# Patient Record
Sex: FEMALE | Race: BLACK OR AFRICAN AMERICAN
[De-identification: names, ages, dates, MRNs, and addresses within clinical notes are randomized per-mention and may not be internally consistent; named-entity substitution may affect disease eponyms.]

---

## 2017-03-21 ENCOUNTER — HOSPITAL ENCOUNTER (EMERGENCY)
Dept: HOSPITAL 61 - ER | Age: 22
Discharge: HOME | End: 2017-03-21
Payer: COMMERCIAL

## 2017-03-21 VITALS — HEIGHT: 65.5 IN | BODY MASS INDEX: 36.21 KG/M2 | WEIGHT: 220 LBS

## 2017-03-21 VITALS — DIASTOLIC BLOOD PRESSURE: 72 MMHG | SYSTOLIC BLOOD PRESSURE: 124 MMHG

## 2017-03-21 DIAGNOSIS — J32.9: Primary | ICD-10-CM

## 2017-03-21 DIAGNOSIS — J45.909: ICD-10-CM

## 2017-03-21 DIAGNOSIS — E11.9: ICD-10-CM

## 2017-03-21 PROCEDURE — 99283 EMERGENCY DEPT VISIT LOW MDM: CPT

## 2017-03-21 PROCEDURE — 81025 URINE PREGNANCY TEST: CPT

## 2017-03-21 NOTE — PHYS DOC
Past Medical History


Past Medical History:  Asthma, Diabetes-Type II


Additional Past Medical Histor:  INSULIN RESISTANCE SYNDROME


Past Surgical History:  Cholecystectomy, Other


Additional Past Surgical Histo:  WISDOM TEETH EXTRACT


Alcohol Use:  None


Drug Use:  None





Adult General


Chief Complaint


Chief Complaint:  FACE PAIN





[facility]


HPI


Patient is a 21  year old female presents emergency department stating that she'

s had sinus congestion with nasal drainage and a cough that started about a 

month ago. She states that this time she just has a nasal pressure and 

congestion. She states she has sinus pressure over the frontal area. She states 

that she has been having chills but denies fevers that she has not taken her 

temperature. Patient states her last menstrual period was in February 

tenderness concern for pregnancy. She does state she took a pregnancy test 2 

weeks ago at home and it was negative. Denies vaginal discharge. Denies any 

abdominal pain or discomfort.





Review of Systems


Review of Systems





Constitutional: Denies fever. C/o chills


Eyes: Denies change in visual acuity, redness, or eye pain []


HENT:  nasal congestion, frontal sinus pressure denies sore throat []


Respiratory: Denies cough or shortness of breath []


Cardiovascular: No additional information not addressed in HPI []


GI: Denies abdominal pain, nausea, vomiting, bloody stools or diarrhea []


: Denies dysuria or hematuria []


Musculoskeletal: Denies back pain or joint pain []


Integument: Denies rash or skin lesions []


Neurologic: Denies headache, focal weakness or sensory changes []





Allergies


Allergies





 Allergies








Coded Allergies Type Severity Reaction Last Updated Verified


 


  No Known Drug Allergies    10/1/15 No











Physical Exam


Physical Exam





Constitutional: Well developed, well nourished, no acute distress, non-toxic 

appearance. []


HENT: Normocephalic, atraumatic, bilateral external ears normal, oropharynx 

moist, no oral exudates, nose normal. Bilateral tympanic membranes appear to be 

normal. Patient with frontal sinus pressure. Patient with nasal congestion. 

Throat appears to have postnasal drip no erythematous no exudate noted.


Eyes: PERRLA, EOMI, conjunctiva normal, no discharge. [] 


Neck: Normal range of motion, no tenderness, supple, no stridor. [] 


Cardiovascular:Heart rate regular rhythm, no murmur []


Lungs & Thorax:  Bilateral breath sounds clear to auscultation []


Skin: Warm, dry, no erythema, no rash. [] 


Back: No tenderness


Extremities: No tenderness, no cyanosis, no clubbing, ROM intact, no edema. [] 


Neurologic: Alert and oriented X 3, normal motor function, normal sensory 

function, no focal deficits noted. []


Psychologic: Affect normal, judgement normal, mood normal. []





Current Patient Data


Vital Signs





 Vital Signs








  Date Time  Temp Pulse Resp B/P Pulse Ox O2 Delivery O2 Flow Rate FiO2


 


3/21/17 15:47 97.4 86 16  100 Room Air  





 97.4       








Lab Values





 Laboratory Tests








Test


  3/21/17


15:44


 


POC Urine HCG, Qualitative


  Hcg negative


(Negative)











EKG


EKG


[]





Radiology/Procedures


Radiology/Procedures


[]





Course & Med Decision Making


Course & Med Decision Making


Pertinent Labs and Imaging studies reviewed. (See chart for details)


Pregnancy test is negative. Patient will be placed on Augmentin with 

recommendations for Sudafed and Mucinex as instructed by . 

Recommended plenty of fluids. Signs symptoms to return back to emergency 

department as been provided. Patient agrees with discharge instructions 

treatment regimens and follow-up recommendations.


[]





Dragon Disclaimer


Dragon Disclaimer


This electronic medical record was generated, in whole or in part, using a 

voice recognition dictation system.





Departure


Departure


Impression:  


 Primary Impression:  


 Sinusitis


Disposition:  01 HOME, SELF-CARE


Condition:  STABLE


Referrals:  


NO PCP (PCP)


Patient Instructions:  Sinusitis, Easy-to-Read





Additional Instructions:


Pregnancy test was negative


Activity as tolerated


Sudafed as directed by manufacture


Mucinex as directed by manufacture


Medication as prescribed


Drink plenty of fluids


Followup with primary care provider in 3-5 days


Return to emergency department as needed for signs and symptoms that become 

worse.


Scripts


Amoxicillin/Potassium Clav (Augmentin 875-125 Tablet)1 Each Tablet1 Tab PO BID #

20 TAB


   Prov:YELENA FLEMING         3/21/17








YELENA FLEMING Mar 21, 2017 16:29

## 2017-05-20 ENCOUNTER — HOSPITAL ENCOUNTER (EMERGENCY)
Dept: HOSPITAL 61 - ER | Age: 22
Discharge: HOME | End: 2017-05-20
Payer: COMMERCIAL

## 2017-05-20 VITALS — DIASTOLIC BLOOD PRESSURE: 69 MMHG | SYSTOLIC BLOOD PRESSURE: 127 MMHG

## 2017-05-20 VITALS — WEIGHT: 260 LBS | HEIGHT: 65 IN | BODY MASS INDEX: 43.32 KG/M2

## 2017-05-20 DIAGNOSIS — J45.909: ICD-10-CM

## 2017-05-20 DIAGNOSIS — H92.02: Primary | ICD-10-CM

## 2017-05-20 DIAGNOSIS — E11.9: ICD-10-CM

## 2017-05-20 PROCEDURE — 99281 EMR DPT VST MAYX REQ PHY/QHP: CPT

## 2017-05-20 NOTE — PHYS DOC
Past Medical History


Past Medical History:  Asthma, Diabetes-Type II


Additional Past Medical Histor:  INSULIN RESISTANCE SYNDROME


Past Surgical History:  Cholecystectomy, Other


Additional Past Surgical Histo:  WISDOM TEETH EXTRACT


Alcohol Use:  None


Drug Use:  None





Adult General


Chief Complaint


Chief Complaint:  EARACHE/EAR PAIN





[facility]


HPI





Patient is a 21  year old female presents emergency department stating that she 

went out with her friends for the weekend and has been drinking. She states she 

came back, and went to bed. She states when she got up this morning she had 

muffled hearing out of the left ear. She states she did take a be penetrated 

clean her ear out. She denies obtaining any type of object. She denies any 

drainage or discharge coming from the site. She denies any fever, chills or any 

nausea vomiting.





Review of Systems


Review of Systems





Constitutional: Denies fever or chills []


Eyes: Denies change in visual acuity, redness, or eye pain []


HENT: Denies nasal congestion or sore throat. C/o left ear discomfort


Respiratory: Denies cough or shortness of breath []


Cardiovascular: No additional information not addressed in HPI []


GI: Denies abdominal pain, nausea, vomiting, bloody stools or diarrhea []


: Denies dysuria or hematuria []


Musculoskeletal: Denies back pain or joint pain []


Integument: Denies rash or skin lesions []


Neurologic: Denies headache, focal weakness or sensory changes []


Endocrine: Denies polyuria or polydipsia []





Allergies


Allergies





Allergies








Coded Allergies Type Severity Reaction Last Updated Verified


 


  No Known Drug Allergies    10/1/15 No











Physical Exam


Physical Exam





Constitutional: Well developed, well nourished, no acute distress, non-toxic 

appearance. []


HENT: Normocephalic, atraumatic, bilateral external ears normal, oropharynx 

moist, no oral exudates, nose normal. Right tympanic membranes appear to be 

normal. Left tympanic membrane appears to have an effusion although the area is 

not red. It does appear that she has a ruptured tympanic membrane as there is 

dark colored secretions noted at the lower part of the tympanic membrane. 

However this may also be a cerumen impaction.


Eyes: PERRLA, EOMI, conjunctiva normal, no discharge. [] 


Neck: Normal range of motion, no tenderness, supple, no stridor. [] 


Cardiovascular:Heart rate regular rhythm, no murmur []


Lungs & Thorax:  Bilateral breath sounds clear to auscultation []


Skin: Warm, dry, no erythema, no rash. [] 


Back: No tenderness,


Extremities: No tenderness, no cyanosis, no clubbing, ROM intact, no edema. [] 


Neurologic: Alert and oriented X 3, normal motor function, normal sensory 

function, no focal deficits noted. []


Psychologic: Affect normal, judgement normal, mood normal. []





Current Patient Data


Vital Signs





 Vital Signs








  Date Time  Temp Pulse Resp B/P (MAP) Pulse Ox O2 Delivery O2 Flow Rate FiO2


 


5/20/17 11:37 98.2 91 18  98 Room Air  





 98.2       











EKG


EKG


[]





Radiology/Procedures


Radiology/Procedures


[]





Course & Med Decision Making


Course & Med Decision Making


Pertinent Labs and Imaging studies reviewed. (See chart for details)


She'll be discharged home in stable condition with recommendations for Tylenol 

or ibuprofen for pain and discomfort. She'll be discharged with signs and 

symptoms to return back to emergency department. Patient agrees with discharge 

instructions treatment regimens and follow-up recommendations.


[]





Dragon Disclaimer


Dragon Disclaimer


This electronic medical record was generated, in whole or in part, using a 

voice recognition dictation system.





Departure


Departure


Impression:  


 Primary Impression:  


 Otalgia of left ear


Disposition:  01 HOME, SELF-CARE


Condition:  STABLE


Referrals:  


NO PCP (PCP)


Patient Instructions:  Otalgia-Brief, Tympanic Membrane Perforation-SportsMed





Additional Instructions:  


Activity as tolerated.


Medications as prescribed.


Warm moist packs to the ear area.


Keep the area clean and dry. Whenever you're taking a shower make sure you 

place a cotton ball in the ear to prevent water from going into the ear.


Follow-up through primary care physician in next 3-5 days.


Return back to emergency prior signs symptoms of become worse.











YELENA FLEMING APRN May 20, 2017 11:56

## 2017-09-09 ENCOUNTER — HOSPITAL ENCOUNTER (EMERGENCY)
Dept: HOSPITAL 61 - ER | Age: 22
Discharge: HOME | End: 2017-09-09
Payer: COMMERCIAL

## 2017-09-09 VITALS — DIASTOLIC BLOOD PRESSURE: 63 MMHG | SYSTOLIC BLOOD PRESSURE: 143 MMHG

## 2017-09-09 VITALS — HEIGHT: 65 IN | BODY MASS INDEX: 43.32 KG/M2 | WEIGHT: 260 LBS

## 2017-09-09 DIAGNOSIS — N93.8: Primary | ICD-10-CM

## 2017-09-09 DIAGNOSIS — E88.81: ICD-10-CM

## 2017-09-09 DIAGNOSIS — J45.909: ICD-10-CM

## 2017-09-09 DIAGNOSIS — Z90.49: ICD-10-CM

## 2017-09-09 DIAGNOSIS — E11.9: ICD-10-CM

## 2017-09-09 PROCEDURE — 99282 EMERGENCY DEPT VISIT SF MDM: CPT

## 2017-09-09 PROCEDURE — 81025 URINE PREGNANCY TEST: CPT

## 2017-09-09 NOTE — PHYS DOC
Past Medical History


Past Medical History:  Asthma, Diabetes-Type II


Additional Past Medical Histor:  INSULIN RESISTANCE SYNDROME


Past Surgical History:  Cholecystectomy, Other


Additional Past Surgical Histo:  WISDOM TEETH EXTRACT


Alcohol Use:  None


Drug Use:  None





Adult General


Chief Complaint


Chief Complaint:  VAGINAL PROBLEM





HPI


HPI





Patient is a 21  year old female presents to the emergency department with a one

-week history of vaginal bleeding. Patient states her last normal menstrual 

period was , normal cycle normal timing. Patient states that one week 

ago she developed vaginal bleeding. She states she has no abdominal pain, no 

cramping. No vaginal discharge prior to the bleeding. She states she is not 

sexually active and has not been for 6 months. She has no complaints of headache

, lightheadedness, chest pain, abdominal pain, nausea, vomiting. Patient 

reports that she saturates 3 pads in a 24-hour window.





Review of Systems


Review of Systems





Constitutional: Denies fever or chills []


Eyes: Denies change in visual acuity, redness, or eye pain []


HENT: Denies nasal congestion or sore throat []


Respiratory: Denies cough or shortness of breath []


Cardiovascular: No additional information not addressed in HPI []


GI: Denies abdominal pain, nausea, vomiting, bloody stools or diarrhea []


: Denies dysuria or hematuria, vaginal bleeding[]


Musculoskeletal: Denies back pain or joint pain []


Integument: Denies rash or skin lesions []


Neurologic: Denies headache, focal weakness or sensory changes []


Endocrine: Denies polyuria or polydipsia []





Allergies


Allergies





Allergies








Coded Allergies Type Severity Reaction Last Updated Verified


 


  No Known Drug Allergies    10/1/15 No











Physical Exam


Physical Exam





Constitutional: Well developed, well nourished, no acute distress, non-toxic 

appearance. []


HENT: Normocephalic, atraumatic, bilateral external ears normal, oropharynx 

moist, no oral exudates, nose normal. []


Eyes: PERRLA, EOMI, conjunctiva normal, no discharge. [] 


Neck: Normal range of motion, no tenderness, supple, no stridor. [] 


Cardiovascular:Heart rate regular rhythm, no murmur []


Lungs & Thorax:  Bilateral breath sounds clear to auscultation []


Abdomen: Bowel sounds normal, soft, no tenderness, no masses, no pulsatile 

masses.


:  pt refused


Skin: Warm, dry, no erythema, no rash. [] 


Back: No tenderness, no CVA tenderness. []





EKG


EKG


[]





Radiology/Procedures


Radiology/Procedures


[]





Course & Med Decision Making


Course & Med Decision Making


Pertinent Labs and Imaging studies reviewed. (See chart for details)





Differential diagnosis: Dysfunctional uterine bleeding, spontaneous , 

bleeding in pregnancy, menses





Patient  is now pregnant, her vital signs are stable without tachycardia or 

hypotension, she has no abdominal pain, she has no vaginal discharge. Patient 

states she is not at risk for an STD. At this time, patient we discharged home 

with plan to follow-up as scheduled at [facility].





Dragon Disclaimer


Dragon Disclaimer


This electronic medical record was generated, in whole or in part, using a 

voice recognition dictation system.





Departure


Departure


Impression:  


 Primary Impression:  


 DUB (dysfunctional uterine bleeding)


Disposition:  01 HOME, SELF-CARE


Condition:  STABLE


Referrals:  


NO PCP (PCP)








[facility]


Patient Instructions:  Uterine Bleeding, Dysfunctional





Additional Instructions:  


Continue present care. Follow up with [facility] as scheduled. Please call 

them on Monday to see if they may see you sooner. Return to the emergency 

department for new symptoms or concerns or worsening of current condition.











ADRIANNA GARZA Sep 9, 2017 17:34